# Patient Record
Sex: FEMALE
[De-identification: names, ages, dates, MRNs, and addresses within clinical notes are randomized per-mention and may not be internally consistent; named-entity substitution may affect disease eponyms.]

---

## 2018-10-02 ENCOUNTER — HOSPITAL ENCOUNTER (OUTPATIENT)
Dept: HOSPITAL 88 - OR | Age: 53
Discharge: HOME | End: 2018-10-02
Attending: INTERNAL MEDICINE
Payer: COMMERCIAL

## 2018-10-02 VITALS — DIASTOLIC BLOOD PRESSURE: 85 MMHG | SYSTOLIC BLOOD PRESSURE: 118 MMHG

## 2018-10-02 DIAGNOSIS — E78.5: ICD-10-CM

## 2018-10-02 DIAGNOSIS — F41.9: ICD-10-CM

## 2018-10-02 DIAGNOSIS — K52.9: ICD-10-CM

## 2018-10-02 DIAGNOSIS — M54.9: ICD-10-CM

## 2018-10-02 DIAGNOSIS — R14.2: ICD-10-CM

## 2018-10-02 DIAGNOSIS — Z01.810: ICD-10-CM

## 2018-10-02 DIAGNOSIS — E11.9: ICD-10-CM

## 2018-10-02 DIAGNOSIS — Z80.0: ICD-10-CM

## 2018-10-02 DIAGNOSIS — K64.8: ICD-10-CM

## 2018-10-02 DIAGNOSIS — Z98.0: ICD-10-CM

## 2018-10-02 DIAGNOSIS — K21.9: ICD-10-CM

## 2018-10-02 DIAGNOSIS — M54.2: ICD-10-CM

## 2018-10-02 DIAGNOSIS — F32.9: ICD-10-CM

## 2018-10-02 DIAGNOSIS — I10: ICD-10-CM

## 2018-10-02 DIAGNOSIS — Z79.4: ICD-10-CM

## 2018-10-02 DIAGNOSIS — Z12.11: Primary | ICD-10-CM

## 2018-10-02 PROCEDURE — 82948 REAGENT STRIP/BLOOD GLUCOSE: CPT

## 2018-10-02 PROCEDURE — 36415 COLL VENOUS BLD VENIPUNCTURE: CPT

## 2018-10-02 PROCEDURE — 93005 ELECTROCARDIOGRAM TRACING: CPT

## 2018-10-02 PROCEDURE — 45378 DIAGNOSTIC COLONOSCOPY: CPT

## 2018-10-02 PROCEDURE — 45380 COLONOSCOPY AND BIOPSY: CPT

## 2020-09-29 ENCOUNTER — HOSPITAL ENCOUNTER (OUTPATIENT)
Dept: HOSPITAL 88 - US | Age: 55
End: 2020-09-29
Attending: OBSTETRICS & GYNECOLOGY
Payer: COMMERCIAL

## 2020-09-29 DIAGNOSIS — N95.0: Primary | ICD-10-CM

## 2020-09-29 PROCEDURE — 76830 TRANSVAGINAL US NON-OB: CPT

## 2020-09-29 PROCEDURE — 76856 US EXAM PELVIC COMPLETE: CPT

## 2020-09-29 NOTE — DIAGNOSTIC IMAGING REPORT
EXAM:  US TRANSVAGINAL



DATE: 9/29/2020 2:44 PM  



INDICATION: Postmenopausal bleeding  



COMPARISON: None



FINDINGS:

The uterus is normal in size measuring 5.8 x 2.5 x 2.7 cm. A 2 mm calcification

is identified within the uterine parenchyma. No other focal intrauterine

lesions are identified. The endometrial stripe measures 2 mm in maximal

thickness. Trace amount of simple appearing fluid is noted within the

endometrial canal.



The right ovary measures 1.3 x 0.7 x 1.1 cm. The left ovary is not visualized

and reportedly surgically absent. No abnormal adnexal masses are identified. No

free fluid is visualized within the pelvis.





IMPRESSION:



Status post left oophorectomy.



Trace amount simple appearing fluid noted within the endometrial canal.



Otherwise, unremarkable pelvic ultrasound examination.



Signed by: Dr. Tyrel Hansen MD on 9/29/2020 3:42 PM

## 2020-10-07 ENCOUNTER — HOSPITAL ENCOUNTER (OUTPATIENT)
Dept: HOSPITAL 88 - LAB | Age: 55
End: 2020-10-07
Attending: PAIN MEDICINE
Payer: COMMERCIAL

## 2020-10-07 DIAGNOSIS — M19.011: ICD-10-CM

## 2020-10-07 DIAGNOSIS — M54.2: ICD-10-CM

## 2020-10-07 DIAGNOSIS — M54.5: Primary | ICD-10-CM

## 2020-10-07 LAB
BASOPHILS # BLD AUTO: 0.1 10*3/UL (ref 0–0.1)
BASOPHILS NFR BLD AUTO: 0.5 % (ref 0–1)
DEPRECATED NEUTROPHILS # BLD AUTO: 5.6 10*3/UL (ref 2.1–6.9)
EOSINOPHIL # BLD AUTO: 0.2 10*3/UL (ref 0–0.4)
EOSINOPHIL NFR BLD AUTO: 2.2 % (ref 0–6)
ERYTHROCYTE [DISTWIDTH] IN CORD BLOOD: 12.2 % (ref 11.7–14.4)
ERYTHROCYTE [SEDIMENTATION RATE] IN BLOOD BY WESTERGREN METHOD: 15 MM/HR (ref 0–20)
HCT VFR BLD AUTO: 43.8 % (ref 34.2–44.1)
HGB BLD-MCNC: 14.4 G/DL (ref 12–16)
LYMPHOCYTES # BLD: 3.2 10*3/UL (ref 1–3.2)
LYMPHOCYTES NFR BLD AUTO: 33.4 % (ref 18–39.1)
MCH RBC QN AUTO: 30.1 PG (ref 28–32)
MCHC RBC AUTO-ENTMCNC: 32.9 G/DL (ref 31–35)
MCV RBC AUTO: 91.4 FL (ref 81–99)
MONOCYTES # BLD AUTO: 0.5 10*3/UL (ref 0.2–0.8)
MONOCYTES NFR BLD AUTO: 4.9 % (ref 4.4–11.3)
NEUTS SEG NFR BLD AUTO: 58.8 % (ref 38.7–80)
PLATELET # BLD AUTO: 344 X10E3/UL (ref 140–360)
RBC # BLD AUTO: 4.79 X10E6/UL (ref 3.6–5.1)

## 2020-10-07 PROCEDURE — 86140 C-REACTIVE PROTEIN: CPT

## 2020-10-07 PROCEDURE — 85651 RBC SED RATE NONAUTOMATED: CPT

## 2020-10-07 PROCEDURE — 36415 COLL VENOUS BLD VENIPUNCTURE: CPT

## 2020-10-07 PROCEDURE — 85025 COMPLETE CBC W/AUTO DIFF WBC: CPT
